# Patient Record
Sex: FEMALE | Race: WHITE | NOT HISPANIC OR LATINO | Employment: UNEMPLOYED | URBAN - METROPOLITAN AREA
[De-identification: names, ages, dates, MRNs, and addresses within clinical notes are randomized per-mention and may not be internally consistent; named-entity substitution may affect disease eponyms.]

---

## 2022-07-05 RX ORDER — LEVOTHYROXINE SODIUM 0.1 MG/1
100 TABLET ORAL DAILY
COMMUNITY

## 2022-07-05 RX ORDER — MELATONIN
1000 DAILY
COMMUNITY

## 2022-07-06 ENCOUNTER — TELEMEDICINE (OUTPATIENT)
Dept: PERINATAL CARE | Facility: CLINIC | Age: 47
End: 2022-07-06
Payer: COMMERCIAL

## 2022-07-06 DIAGNOSIS — O09.519 ADVANCED MATERNAL AGE, PRIMIGRAVIDA, ANTEPARTUM: ICD-10-CM

## 2022-07-06 DIAGNOSIS — Z31.69 PRE-CONCEPTION COUNSELING: Primary | ICD-10-CM

## 2022-07-06 PROBLEM — E03.9 HYPOTHYROIDISM: Status: ACTIVE | Noted: 2022-07-06

## 2022-07-06 PROCEDURE — 99202 OFFICE O/P NEW SF 15 MIN: CPT | Performed by: OBSTETRICS & GYNECOLOGY

## 2022-07-06 NOTE — PROGRESS NOTES
CONSULTATION: MATERNAL-FETAL MEDICINE    Dear Do Aggie Puga [facility]   [address]  [facility],  [address] [facility],    Thank you very much for your kind referral of patient Casey Sepulveda for Maternal-Fetal Medicine consultation  Casey Sepulveda is a 52 y o   who is currently under the care of Dr Suzie Peters and planning a frozen embryo transfer with a donor embryo  She is hopeful that the transfer will be this summer  Casey Sepulveda was informed that this is a telemedicine visit and that the visit is being conducted through [facility] and patient was informed this is a secure, HIPAA-complaint platform  She agrees to proceed  My office door was closed  No one else was in the room  She acknowledged consent and understanding of privacy and security of the video platform  The patient has agreed to participate and understands they can discontinue the visit at any time  Patient is aware this is a billable service  Patient Location: [place]    Obstetric History:  OB History    Para Term  AB Living   4 0     4     SAB IAB Ectopic Multiple Live Births   4       0      # Outcome Date GA Lbr Zeeshan/2nd Weight Sex Delivery Anes PTL Lv   4 SAB            3 SAB            2 SAB            1 SAB                Past Medical History:  Past Medical History:   Diagnosis Date    Hashimoto's thyroiditis     Skin cancer     Basal cell       Past Surgical History:  Past Surgical History:   Procedure Laterality Date    SKIN CANCER EXCISION      basal cell       Social History:   She denies current use of drugs of abuse, tobacco, and marijuana products  She drinks alcohol socially    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  Partner: Aaron Mendiola, age 39    Family History:  Family history was reviewed using an office screening tool, and is negative for congenital anomalies, genetic diseases, and thromboembolism in first degree relatives of this pregnancy  Family history is notable for early onset breast cancer in her mother in her 45s, and early onset ovarian cancer in her maternal great grandmother  Beth Zamudio is up to date on her mammograms  Medications:    Current Outpatient Medications:     cholecalciferol (VITAMIN D3) 1,000 units tablet, Take 1,000 Units by mouth daily, Disp: , Rfl:     levothyroxine 100 mcg tablet, Take 100 mcg by mouth daily, Disp: , Rfl:     Allergies:  No Known Allergies    Review of Systems:  Review of Systems    Exam:  Physical Exam  Constitutional:       Appearance: Normal appearance  She is normal weight  HENT:      Head: Normocephalic and atraumatic  Pulmonary:      Effort: Pulmonary effort is normal    Neurological:      General: No focal deficit present  Mental Status: She is alert  Mental status is at baseline  Psychiatric:         Mood and Affect: Mood normal          Behavior: Behavior normal          Thought Content: Thought content normal         Vitals: There were no vitals taken for this visit  My recommendations are as follows:     Pre-conception counseling  Beth Zamudio is planning a frozen embryo (donor embryo) transfer this summer  Pregnancies achieved through in vitro fertilization (IVF) are associated with a slight increase in risk of several pregnancy complications, including abnormal placentation,  delivery, hypertensive disorders of pregnancy, low birth weight, and congenital malformations (specifically congenital heart disease)  However, many of these risks are confounded by higher rates of multiple gestations, as well as medical complications among women undergoing IVF compared to those who conceive spontaneously  Fetal number and maternal co-morbidities are stronger predictors of pregnancy outcome than method of conception itself   For women who achieve pregnancy through IVF, a screening fetal echocardiogram is recommended at 22 weeks gestation, in light of the slight increase in risk of congenital heart disease  Evaluation of fetal growth is recommended at 32 weeks gestation  Weekly non-stress testing at 36 weeks gestation is recommended for this indication  We reviewed her history of hypothyroidism, and management in pregnancy  The goal in pregnancy is to maintain a euthyroid maternal state, which is important for maternal health and fetal development  Thyroid function should be assessed at least each trimester using thyroid stimulating hormone (TSH) levels  After medication dose-adjustments, labs should be repeated every 4-6 weeks until euthyroid  Goal TSH levels in pregnancy recommended by the American Thyroid Association are 0 1-2 5 mIU/L (first trimester), 0 2-3 0 mIU/L (second trimester) and 0 3-3 0 mIU/L (third trimester)  Levothyroxine dose should be titrated to achieve these trimester-specific ranges  It is typical for women to require a dose increase of levothyroxine during pregnancy, often with a reduction needed postpartum  Although women with overt, uncontrolled hypothyroidism are at risk for adverse pregnancy outcomes (including miscarriage, preeclampsia,  birth, placental abruption, and stillbirth), adequate thyroid hormone replacement in pregnancy minimizes the risk of adverse outcomes  She is euthyroid based on her most recent TSH of 1 65 in May 2022, and is closely followed by her endocrinologist      Based on her age, IVF, and hypothyroidism, there is an increased risk of preeclampsia in a future pregnancy  We discussed that low dose aspirin (81-162mg daily) would be advised to reduce the risk of preeclampsia, beginning at 12 weeks gestation  We reviewed the options for aneuploidy screening (including NIPS as early as 9-10 weeks gestation)  We reviewed the limitations of NIPS screening, as well as the limitations of PGT-A done prior to embryo transfer   The only definitive means of definitive prenatal genetic diagnosis is through Chorionic Villus Sampling (CVS) or amniocentesis  We reviewed the ultrasounds available through [facility] in a future pregnancy, including 12-13 week nuchal translucency and first trimester anatomic survey, detailed anatomic survey at 20 weeks gestation, fetal echocardiogram at 22-24 weeks gestation  We reviewed that fetal growth would be monitored in the third trimester, and antepartum surveillance would be recommended at 36 weeks given slightly increased risk of stillbirth  Mode of delivery is according to usual obstetric indications, and she is interested in a vaginal delivery if possible  Evaluation and Management:  By TIME: The patient was counseled regarding the above recommendations  The approximate face-to-face time was 15 minutes  The majority of time (>50%) was spent counseling and/or coordinating care with the patient and/or family members  At the conclusion of today's encounter, all questions were answered to her satisfaction  Thank you very much for this kind referral and please do not hesitate to contact me with any further questions or concerns  Sincerely,    Monse Gan MD  Attending Physician, [place]          VIRTUAL VISIT DISCLAIMER    Richy Orellana acknowledges that she has consented to an online visit or consultation  She understands that the online visit is based solely on information provided by her, and that, in the absence of a face-to-face physical evaluation by the physician, the diagnosis she receives is both limited and provisional in terms of accuracy and completeness  This is not intended to replace a full medical face-to-face evaluation by the physician  Richy Orellana understands and accepts these terms

## 2022-07-06 NOTE — LETTER
2022     Polly Deshpande, [id number] [address]    Patient: Qasim Leiva   YOB: 1975   Date of Visit: 2022       Dear Dr Kourtney Espinosa: Thank you for referring Qasim Leiva to me for evaluation  Below are my notes for this consultation  If you have questions, please do not hesitate to call me  I look forward to following your patient along with you  Sincerely,         Center Virtual Visit        CC: Hernando Cisneros MD  2022  2:28 PM  Sign when Signing Visit  CONSULTATION: MATERNAL-FETAL MEDICINE    Dear Polly Deshpande Do  [facility] - [address] [place] [address],    Thank you very much for your kind referral of patient Qasim Leiva for Maternal-Fetal Medicine consultation  Qasim Leiva is a 52 y o   who is currently under the care of Dr Kourtney Espinosa and planning a frozen embryo transfer with a donor embryo  She is hopeful that the transfer will be this summer  Qasim Leiva was informed that this is a telemedicine visit and that the visit is being conducted through [facility] and patient was informed this is a secure, HIPAA-complaint platform  She agrees to proceed  My office door was closed  No one else was in the room  She acknowledged consent and understanding of privacy and security of the video platform  The patient has agreed to participate and understands they can discontinue the visit at any time  Patient is aware this is a billable service       Patient Location: [place]    Obstetric History:  OB History    Para Term  AB Living   4 0     4     SAB IAB Ectopic Multiple Live Births   4       0      # Outcome Date GA Lbr Zeeshan/2nd Weight Sex Delivery Anes PTL Lv   4 SAB            3 SAB            2 SAB            1 SAB                Past Medical History:  Past Medical History: Diagnosis Date    Hashimoto's thyroiditis     Skin cancer     Basal cell       Past Surgical History:  Past Surgical History:   Procedure Laterality Date    SKIN CANCER EXCISION      basal cell       Social History:   She denies current use of drugs of abuse, tobacco, and marijuana products  She drinks alcohol socially  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  Partner: Rodney Weaver, age 39    Family History:  Family history was reviewed using an office screening tool, and is negative for congenital anomalies, genetic diseases, and thromboembolism in first degree relatives of this pregnancy  Family history is notable for early onset breast cancer in her mother in her 45s, and early onset ovarian cancer in her maternal great grandmother  Celestina Malcolm is up to date on her mammograms  Medications:    Current Outpatient Medications:     cholecalciferol (VITAMIN D3) 1,000 units tablet, Take 1,000 Units by mouth daily, Disp: , Rfl:     levothyroxine 100 mcg tablet, Take 100 mcg by mouth daily, Disp: , Rfl:     Allergies:  No Known Allergies    Review of Systems:  Review of Systems    Exam:  Physical Exam  Constitutional:       Appearance: Normal appearance  She is normal weight  HENT:      Head: Normocephalic and atraumatic  Pulmonary:      Effort: Pulmonary effort is normal    Neurological:      General: No focal deficit present  Mental Status: She is alert  Mental status is at baseline  Psychiatric:         Mood and Affect: Mood normal          Behavior: Behavior normal          Thought Content: Thought content normal         Vitals: There were no vitals taken for this visit  My recommendations are as follows:     Pre-conception counseling  Celestina Malcolm is planning a frozen embryo (donor embryo) transfer this summer   Pregnancies achieved through in vitro fertilization (IVF) are associated with a slight increase in risk of several pregnancy complications, including abnormal placentation,  delivery, hypertensive disorders of pregnancy, low birth weight, and congenital malformations (specifically congenital heart disease)  However, many of these risks are confounded by higher rates of multiple gestations, as well as medical complications among women undergoing IVF compared to those who conceive spontaneously  Fetal number and maternal co-morbidities are stronger predictors of pregnancy outcome than method of conception itself  For women who achieve pregnancy through IVF, a screening fetal echocardiogram is recommended at 22 weeks gestation, in light of the slight increase in risk of congenital heart disease  Evaluation of fetal growth is recommended at 32 weeks gestation  Weekly non-stress testing at 36 weeks gestation is recommended for this indication  We reviewed her history of hypothyroidism, and management in pregnancy  The goal in pregnancy is to maintain a euthyroid maternal state, which is important for maternal health and fetal development  Thyroid function should be assessed at least each trimester using thyroid stimulating hormone (TSH) levels  After medication dose-adjustments, labs should be repeated every 4-6 weeks until euthyroid  Goal TSH levels in pregnancy recommended by the American Thyroid Association are 0 1-2 5 mIU/L (first trimester), 0 2-3 0 mIU/L (second trimester) and 0 3-3 0 mIU/L (third trimester)  Levothyroxine dose should be titrated to achieve these trimester-specific ranges  It is typical for women to require a dose increase of levothyroxine during pregnancy, often with a reduction needed postpartum  Although women with overt, uncontrolled hypothyroidism are at risk for adverse pregnancy outcomes (including miscarriage, preeclampsia,  birth, placental abruption, and stillbirth), adequate thyroid hormone replacement in pregnancy minimizes the risk of adverse outcomes   She is euthyroid based on her most recent TSH of 1 65 in May 2022, and is closely followed by her endocrinologist      Based on her age, IVF, and hypothyroidism, there is an increased risk of preeclampsia in a future pregnancy  We discussed that low dose aspirin (81-162mg daily) would be advised to reduce the risk of preeclampsia, beginning at 12 weeks gestation  We reviewed the options for aneuploidy screening (including NIPS as early as 9-10 weeks gestation)  We reviewed the limitations of NIPS screening, as well as the limitations of PGT-A done prior to embryo transfer  The only definitive means of definitive prenatal genetic diagnosis is through Chorionic Villus Sampling (CVS) or amniocentesis  We reviewed the ultrasounds available through [facility] in a future pregnancy, including 12-13 week nuchal translucency and first trimester anatomic survey, detailed anatomic survey at 20 weeks gestation, fetal echocardiogram at 22-24 weeks gestation  We reviewed that fetal growth would be monitored in the third trimester, and antepartum surveillance would be recommended at 36 weeks given slightly increased risk of stillbirth  Mode of delivery is according to usual obstetric indications, and she is interested in a vaginal delivery if possible  Evaluation and Management:  By TIME: The patient was counseled regarding the above recommendations  The approximate face-to-face time was 15 minutes  The majority of time (>50%) was spent counseling and/or coordinating care with the patient and/or family members  At the conclusion of today's encounter, all questions were answered to her satisfaction  Thank you very much for this kind referral and please do not hesitate to contact me with any further questions or concerns  Sincerely,    Katt Byrnes MD  Attending Physician, [place]aim[place]          VIRTUAL VISIT DISCLAIMER    Casey Sepulveda acknowledges that she has consented to an online visit or consultation   She understands that the online visit is based solely on information provided by her, and that, in the absence of a face-to-face physical evaluation by the physician, the diagnosis she receives is both limited and provisional in terms of accuracy and completeness  This is not intended to replace a full medical face-to-face evaluation by the physician  Saray Caldwell understands and accepts these terms

## 2022-07-06 NOTE — ASSESSMENT & PLAN NOTE
Al Sprague is planning a frozen embryo (donor embryo) transfer this summer  Pregnancies achieved through in vitro fertilization (IVF) are associated with a slight increase in risk of several pregnancy complications, including abnormal placentation,  delivery, hypertensive disorders of pregnancy, low birth weight, and congenital malformations (specifically congenital heart disease)  However, many of these risks are confounded by higher rates of multiple gestations, as well as medical complications among women undergoing IVF compared to those who conceive spontaneously  Fetal number and maternal co-morbidities are stronger predictors of pregnancy outcome than method of conception itself  For women who achieve pregnancy through IVF, a screening fetal echocardiogram is recommended at 22 weeks gestation, in light of the slight increase in risk of congenital heart disease  Evaluation of fetal growth is recommended at 32 weeks gestation  Weekly non-stress testing at 36 weeks gestation is recommended for this indication  We reviewed her history of hypothyroidism, and management in pregnancy  The goal in pregnancy is to maintain a euthyroid maternal state, which is important for maternal health and fetal development  Thyroid function should be assessed at least each trimester using thyroid stimulating hormone (TSH) levels  After medication dose-adjustments, labs should be repeated every 4-6 weeks until euthyroid  Goal TSH levels in pregnancy recommended by the American Thyroid Association are 0 1-2 5 mIU/L (first trimester), 0 2-3 0 mIU/L (second trimester) and 0 3-3 0 mIU/L (third trimester)  Levothyroxine dose should be titrated to achieve these trimester-specific ranges  It is typical for women to require a dose increase of levothyroxine during pregnancy, often with a reduction needed postpartum    Although women with overt, uncontrolled hypothyroidism are at risk for adverse pregnancy outcomes (including miscarriage, preeclampsia,  birth, placental abruption, and stillbirth), adequate thyroid hormone replacement in pregnancy minimizes the risk of adverse outcomes  She is euthyroid based on her most recent TSH of 1 65 in May 2022, and is closely followed by her endocrinologist      Based on her age, IVF, and hypothyroidism, there is an increased risk of preeclampsia in a future pregnancy  We discussed that low dose aspirin (81-162mg daily) would be advised to reduce the risk of preeclampsia, beginning at 12 weeks gestation  We reviewed the options for aneuploidy screening (including NIPS as early as 9-10 weeks gestation)  We reviewed the limitations of NIPS screening, as well as the limitations of PGT-A done prior to embryo transfer  The only definitive means of definitive prenatal genetic diagnosis is through Chorionic Villus Sampling (CVS) or amniocentesis  We reviewed the ultrasounds available through [facility] in a future pregnancy, including 12-13 week nuchal translucency and first trimester anatomic survey, detailed anatomic survey at 20 weeks gestation, fetal echocardiogram at 22-24 weeks gestation  We reviewed that fetal growth would be monitored in the third trimester, and antepartum surveillance would be recommended at 36 weeks given slightly increased risk of stillbirth  Mode of delivery is according to usual obstetric indications, and she is interested in a vaginal delivery if possible